# Patient Record
Sex: MALE | Race: WHITE
[De-identification: names, ages, dates, MRNs, and addresses within clinical notes are randomized per-mention and may not be internally consistent; named-entity substitution may affect disease eponyms.]

---
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
## 2019-08-01 ENCOUNTER — HOSPITAL ENCOUNTER (INPATIENT)
Dept: HOSPITAL 25 - AA | Age: 69
LOS: 2 days | Discharge: HOME HEALTH | DRG: 302 | End: 2019-08-03
Attending: ORTHOPAEDIC SURGERY | Admitting: ORTHOPAEDIC SURGERY
Payer: COMMERCIAL

## 2019-08-01 DIAGNOSIS — Z72.89: ICD-10-CM

## 2019-08-01 DIAGNOSIS — Z87.891: ICD-10-CM

## 2019-08-01 DIAGNOSIS — D51.9: ICD-10-CM

## 2019-08-01 DIAGNOSIS — Z83.49: ICD-10-CM

## 2019-08-01 DIAGNOSIS — E11.40: ICD-10-CM

## 2019-08-01 DIAGNOSIS — R33.9: ICD-10-CM

## 2019-08-01 DIAGNOSIS — Z82.3: ICD-10-CM

## 2019-08-01 DIAGNOSIS — Z98.52: ICD-10-CM

## 2019-08-01 DIAGNOSIS — Z87.442: ICD-10-CM

## 2019-08-01 DIAGNOSIS — E78.2: ICD-10-CM

## 2019-08-01 DIAGNOSIS — E78.00: ICD-10-CM

## 2019-08-01 DIAGNOSIS — M25.762: ICD-10-CM

## 2019-08-01 DIAGNOSIS — G47.33: ICD-10-CM

## 2019-08-01 DIAGNOSIS — F33.40: ICD-10-CM

## 2019-08-01 DIAGNOSIS — M17.12: Primary | ICD-10-CM

## 2019-08-01 DIAGNOSIS — Z79.84: ICD-10-CM

## 2019-08-01 DIAGNOSIS — Z83.3: ICD-10-CM

## 2019-08-01 DIAGNOSIS — M21.162: ICD-10-CM

## 2019-08-01 DIAGNOSIS — Z80.0: ICD-10-CM

## 2019-08-01 DIAGNOSIS — I10: ICD-10-CM

## 2019-08-01 DIAGNOSIS — M25.462: ICD-10-CM

## 2019-08-01 DIAGNOSIS — R00.1: ICD-10-CM

## 2019-08-01 PROCEDURE — C1776 JOINT DEVICE (IMPLANTABLE): HCPCS

## 2019-08-01 PROCEDURE — 85018 HEMOGLOBIN: CPT

## 2019-08-01 PROCEDURE — 85014 HEMATOCRIT: CPT

## 2019-08-01 PROCEDURE — 0SRD0J9 REPLACEMENT OF LEFT KNEE JOINT WITH SYNTHETIC SUBSTITUTE, CEMENTED, OPEN APPROACH: ICD-10-PCS | Performed by: ORTHOPAEDIC SURGERY

## 2019-08-01 PROCEDURE — 85049 AUTOMATED PLATELET COUNT: CPT

## 2019-08-01 PROCEDURE — 80048 BASIC METABOLIC PNL TOTAL CA: CPT

## 2019-08-01 PROCEDURE — 36415 COLL VENOUS BLD VENIPUNCTURE: CPT

## 2019-08-01 RX ADMIN — DOCUSATE SODIUM SCH MG: 100 CAPSULE, LIQUID FILLED ORAL at 22:03

## 2019-08-01 RX ADMIN — INSULIN LISPRO SCH: 100 INJECTION, SOLUTION INTRAVENOUS; SUBCUTANEOUS at 21:52

## 2019-08-01 RX ADMIN — SODIUM CHLORIDE, SODIUM LACTATE, POTASSIUM CHLORIDE, AND CALCIUM CHLORIDE SCH MLS/HR: 600; 310; 30; 20 INJECTION, SOLUTION INTRAVENOUS at 18:19

## 2019-08-01 RX ADMIN — APIXABAN SCH MG: 2.5 TABLET, FILM COATED ORAL at 22:04

## 2019-08-01 RX ADMIN — MAGNESIUM HYDROXIDE SCH ML: 400 SUSPENSION ORAL at 22:03

## 2019-08-01 RX ADMIN — CEFAZOLIN SCH MLS/HR: 1 INJECTION, POWDER, FOR SOLUTION INTRAVENOUS at 22:03

## 2019-08-01 RX ADMIN — ATORVASTATIN CALCIUM SCH MG: 40 TABLET, FILM COATED ORAL at 22:04

## 2019-08-01 RX ADMIN — ACETAMINOPHEN SCH MG: 325 TABLET ORAL at 18:21
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
## 2019-08-01 NOTE — PN
Progress Note





- Progress Note


Date of Service: 08/01/19


Note: 


Patient seen at bedside in PACU, s/p LTK.  He is alert and oriented.  He denies 

left knee pain.  He is has sensation and is able to dorsiflex the left foot. 

Dressing dry.

## 2019-08-01 NOTE — OP
Operative Report - Blank





- Operative Report


Date of Operation: 08/01/19


Note: 





SARA APODACA


1950





Date of Surgery: 8/1/19





Carolin Dave MD





Assistant: FRANCISCO JEAN did help throughout the procedure with preparation of 

the knee, wound retraction, manipulation of the knee, and wound closure.  





Anesthesiologist: ELIZABETH Hillman MD





Anesthesia Type: Spinal





Preoperative Diagnosis: Left severe degenerative osteoarthritis of the knee





Postoperative Diagnosis: As above





Procedure Performed: Left Total Knee Arthroplasty





Tourniquet time: 60 minutes





Complications: None





Specimen: Bone and cartilage from the left knee joint sent to pathology.  





Hardware Used: Cemented Smith [employer]hew total knee hardware was used - For the 

femur a size 7 left  legion posterior stabilized femoral component, for the 

tibia a size 6 left corey II tibial baseplate, for the insert a size 9mm 5-6 

posterior stabilized articular polyethylene insert, and for the patella a size 

38 3-peg all poly patella.  





Brief History/Indication: SARA APODACA was known in clinic and had a 

history of severe left knee pain and swelling. He failed conservative treatment 

with anti-inflammatories, pain pills, intra-articular injections and physical 

therapy.  He elected to undergo left total knee arthroplasty due to continued 

pain and decreased quality of life.  Radiographs showed severe end stage 

osteoarthritis of the knee with bone on bone contact.  Informed consent was 

obtained from the patient.  He understood the risks of surgery included but 

were not limited to: bleeding, infection, damage to nearby structures, 

intraoperative fracture, nerve palsy, failure of the hardware, early loosening, 

knee stiffness or loss of motion, anesthesia complications, stroke, heart attack

, blood clot and death.  He wished to proceed.





Intra-Operative Findings: Intraoperatively the patient was noted to have severe 

loss of cartilage in all 3 compartments of the knee.  





Description of the Procedure: 





SARA APODACA was identified in the preanesthesia unit. His left knee was 

marked as the correct operative side.  Informed consent was signed and placed 

in the chart. The patient was taken to the operating room and placed under 

anesthesia without complication. A kapoor catheter was placed. A tourniquet was 

placed on the left thigh. The left lower extremity was prepped and draped in 

the usual sterile fashion. Preoperative time-out was made to correctly identify 

the patient, side and site. Appropriate intraoperative antibiotics were given 

within one hour of incision.





Tourniquet was inflated. A midline incision was made and carried sharply down 

to the extensor mechanism. A new 10 blade was used to make a standard medial 

parapatellar arthrotomy. The patella was subluxed laterally. Electrocautery was 

used to dissect soft tissue off the superomedial tibia to the midsagittal 

plane.  The knee was flexed up. The anterior horn of the lateral meniscus and 

the ACL were sharply incised. A drill was used to enter the distal femur. The 

intramedullary distal femoral cutting guide was pinned on the distal femur. The 

oscillating saw was used to make the distal femoral cut.





The external rotation guide was pinned on the distal femur and the distal femur 

was sized to a size 7. The size 7 multi-cutting jig was pinned on the distal 

femur. The oscillating saw was used to make the appropriate 4 chamfer cuts.  

Next the PCL was completely released.  The extramedullary tibial cutting guide 

was pinned on the proximal tibia and the oscillating saw was used to make the 

proximal tibial cut perpendicular to the mechanical axis of the tibia. The bone 

was carefully removed.





The knee was brought out into full extension. The spacer block was placed and 

had excellent fit with the knee in full extension. The medial and lateral 

ligaments were well balanced. The flexion and extension gaps were well 

balanced. The knee was flexed up. Lamina  was placed both medially and 

laterally. Any remaining meniscus was removed with electrocautery.  Curved 

osteotome was used to remove any posterior osteophytes. The tibial tray and 

drop abdoul were placed and confirmed a satisfactory tibial cut.





The size 7 left femoral trial was impacted onto the distal femur. This trial 

had excellent fit and stability. The box for the posterior stabilized implant 

was prepared using a box cut osteotome and a reamer. Next a tibial tray trial 

and 9 mm insert trial was placed. The knee was taken through a range of motion 

and had full extension to 130 degrees of flexion. Patellofemoral tracking was 

satisfactory.





The patella was inverted and sized to a size 38. Three peg holes were drilled 

through the size 38 drill guide. The trial patella was placed and the knee was 

taken through a range of motion. There was satisfactory patellofemoral 

tracking. All trials were removed. The tibia was subluxed anteriorly and sized 

to a size 6. The proximal tibial was prepared with a size 6 keel punch.  All 

bony cut surfaces were irrigated with sterile saline and dried. Final implants 

were cemented into place starting with the tibia, followed by the femur, and 

last the patella. A 9 mm insert trial was placed and the knee was brought into 

full extension.





Tourniquet was turned down and the knee was copiously irrigated with sterile 

saline. Electrocautery was used to obtain meticulous hemostasis. Once the 

cement had fully cured, the insert trial was removed. Any excess cement was 

removed from around the hardware and capsule.  Final insert chosen was a 9 mm 

posterior stabilized Corey II articular insert size 5-6. Stability of the 

insert was checked and noted to be stable.





The extensor mechanism was closed using number 1 vicryls. The rest of the 

incision was closed in a layered fashion using 0 and 2-0 vicryls. The skin was 

closed using 3-0 nylon suture. Sterile xeroform, 4x4s and webril were used to 

cover the incision.  Ace wrap and cold pack were used to cover the dressings. 

The patients anesthesia was reversed without difficulty. He was taken to the 

PACU in stable condition.  Intended weight-bearing will be as tolerated.

## 2019-08-01 NOTE — XMS REPORT
Continuity of Care Document (CCD)

 Created on:2019



Patient:Edwardo Law

Sex:Male

:1950

External Reference #:[id number]





Demographics







 Address  [address]



   [place] [address]

 

 Mobile Phone  [phone]

 

 Email Address  [email or web address]

 

 Preferred Language  en

 

 Marital Status  Not  or 

 

 Rastafarian Affiliation  Unknown

 

 Race  White

 

 Ethnic Group  Not  or 









Author







 Name  Leni Yo









Support







 Name  Relationship  Address  Phone

 

 Chelsy Law  Unavailable  Unavailable  [phone]









Care Team Providers







 Name  Role  Phone

 

 Georgia Baker MD  Primary Care Physician  Unavailable









Payers







 Date  Identification Numbers  Payment Provider  Subscriber

 

   Policy Number: [id number]  [employer]  Chelsy Law









 PayID: [id number]  [address]









 [place] [address]







Problems







 Active Problems  Provider  Date

 

 Localized, primary osteoarthritis  Carolin Dave M.D.  Onset: 2019







Family History







 Date  Family Member(s)  Observation  Comments

 

   General  Diabetes  

 

   General  Cancer  







Social History







 Type  Date  Description  Comments

 

 Birth Sex    Unknown  

 

 Lives With    Spouse  

 

 Occupation      

 

 ETOH Use    Occasionally consumes  



     alcohol  

 

 Tobacco Use  Start: Unknown End:  Patient is a former smoker  



   Unknown    

 

 Smoking Status  Reviewed: 19  Patient is a former smoker  

 

 Exercise Type/Frequency    Exercises sporadically  







Allergies, Adverse Reactions, Alerts







 Description

 

 No Known Drug Allergies







Medications







 Active Medications  SIG  Qnty  Indications  Ordering Provider  Date

 

 Georgia Jang MD  



 50-1000mg Tablets          



           

 

 Hilaria Chang NP  



 10mg Tablets          

 

 Amlodipine Besylate        Georgia Baker MD  



           5mg Tablets          



           

 

 Irbesartan        Georgia Baker MD  



  300mg Tablets          



           

 

 Atorvastatin Calcium        Georgia Baker MD  



            40mg Tablets          



           









 History Medications









 Meclizine HCL        Georgia Baker MD   - 2019



   12.5mg Tablets          



           







Vital Signs







 Date  Vital  Result  Comment

 

 2019 11:29am  Height  71.5 inches  5'11.50"









 Weight  199.00 lb  

 

 Heart Rate  59 /min  

 

 BP Systolic  138 mmHg  

 

 BP Diastolic  62 mmHg  

 

 Respiratory Rate  12 /min  

 

 Body Temperature  98.2 F  

 

 Pain Level  2  

 

 BMI (Body Mass Index)  27.4 kg/m2  









 2019 10:02am  Height  71.5 inches  5'11.50"









 Weight  199.00 lb  

 

 BP Systolic  146 mmHg  

 

 BP Diastolic  80 mmHg  

 

 Body Temperature  97.8 F  

 

 BMI (Body Mass Index)  27.4 kg/m2  







Results







 Test  Date  Facility  Test  Result  H/L  Range  Note

 

 Xray  2019  [facility]  Knee 3 Views LT  <pending>      



     [address]          



     [place] [address] [phone]          







Encounters







 Type  Date  Location  Provider  Dx  Diagnosis

 

 Office Visit  2019  Or[facility]  Carolin Dave  M25.562  Pain in left knee



   10:00a  [facility]    









 M25.462  Effusion, left knee

 

 M17.12  Unilateral primary osteoarthritis, left knee

 

 M21.162  Varus deformity, not elsewhere classified, left knee







Plan of Treatment

Future Appointment(s):2019  1:30 pm - Carolin Dave M.D. at [facility]  1:30 pm - Carolin Dave M.D. at [facility] - Carolin Dave M.D.M25.562 Pain in left 
kneeFollow up:Follow up:   10- 14 days post opM25.462 Effusion, left kneeM17.12 
Unilateral primary osteoarthritis, left kneeM21.162 Varus deformity, not 
elsewhere classified, left knee

## 2019-08-02 LAB
ANION GAP SERPL CALC-SCNC: 5 MMOL/L (ref 2–11)
BUN SERPL-MCNC: 17 MG/DL (ref 6–24)
BUN/CREAT SERPL: 18.7 (ref 8–20)
CALCIUM SERPL-MCNC: 8.3 MG/DL (ref 8.6–10.3)
CHLORIDE SERPL-SCNC: 99 MMOL/L (ref 101–111)
GLUCOSE SERPL-MCNC: 142 MG/DL (ref 70–100)
HCO3 SERPL-SCNC: 30 MMOL/L (ref 22–32)
HCT VFR BLD AUTO: 39 % (ref 42–52)
HGB BLD-MCNC: 13.5 G/DL (ref 14–18)
PLATELET # BLD AUTO: 189 10^3/UL (ref 150–450)
POTASSIUM SERPL-SCNC: 4 MMOL/L (ref 3.5–5)
SODIUM SERPL-SCNC: 134 MMOL/L (ref 135–145)

## 2019-08-02 RX ADMIN — OXYCODONE HYDROCHLORIDE AND ACETAMINOPHEN PRN TAB: 5; 325 TABLET ORAL at 07:29

## 2019-08-02 RX ADMIN — MORPHINE SULFATE PRN MG: 4 INJECTION INTRAVENOUS at 17:07

## 2019-08-02 RX ADMIN — DOCUSATE SODIUM SCH MG: 100 CAPSULE, LIQUID FILLED ORAL at 09:37

## 2019-08-02 RX ADMIN — ACETAMINOPHEN SCH: 325 TABLET ORAL at 11:34

## 2019-08-02 RX ADMIN — OXYCODONE HYDROCHLORIDE PRN MG: 5 CAPSULE ORAL at 09:43

## 2019-08-02 RX ADMIN — OXYCODONE HYDROCHLORIDE AND ACETAMINOPHEN PRN TAB: 5; 325 TABLET ORAL at 21:36

## 2019-08-02 RX ADMIN — ACETAMINOPHEN SCH: 325 TABLET ORAL at 02:22

## 2019-08-02 RX ADMIN — MAGNESIUM HYDROXIDE SCH ML: 400 SUSPENSION ORAL at 21:13

## 2019-08-02 RX ADMIN — INSULIN LISPRO SCH UNITS: 100 INJECTION, SOLUTION INTRAVENOUS; SUBCUTANEOUS at 13:06

## 2019-08-02 RX ADMIN — INSULIN LISPRO SCH: 100 INJECTION, SOLUTION INTRAVENOUS; SUBCUTANEOUS at 09:33

## 2019-08-02 RX ADMIN — CEFAZOLIN SCH MLS/HR: 1 INJECTION, POWDER, FOR SOLUTION INTRAVENOUS at 06:01

## 2019-08-02 RX ADMIN — OXYCODONE HYDROCHLORIDE AND ACETAMINOPHEN PRN TAB: 5; 325 TABLET ORAL at 17:08

## 2019-08-02 RX ADMIN — INSULIN LISPRO SCH UNITS: 100 INJECTION, SOLUTION INTRAVENOUS; SUBCUTANEOUS at 21:13

## 2019-08-02 RX ADMIN — INSULIN LISPRO SCH UNITS: 100 INJECTION, SOLUTION INTRAVENOUS; SUBCUTANEOUS at 18:23

## 2019-08-02 RX ADMIN — MORPHINE SULFATE PRN MG: 4 INJECTION INTRAVENOUS at 19:21

## 2019-08-02 RX ADMIN — AMLODIPINE BESYLATE SCH MG: 5 TABLET ORAL at 09:37

## 2019-08-02 RX ADMIN — MAGNESIUM HYDROXIDE SCH ML: 400 SUSPENSION ORAL at 09:38

## 2019-08-02 RX ADMIN — ACETAMINOPHEN SCH: 325 TABLET ORAL at 18:34

## 2019-08-02 RX ADMIN — APIXABAN SCH MG: 2.5 TABLET, FILM COATED ORAL at 09:37

## 2019-08-02 RX ADMIN — CEFAZOLIN SCH MLS/HR: 1 INJECTION, POWDER, FOR SOLUTION INTRAVENOUS at 14:14

## 2019-08-02 RX ADMIN — OXYCODONE HYDROCHLORIDE AND ACETAMINOPHEN PRN TAB: 5; 325 TABLET ORAL at 13:07

## 2019-08-02 RX ADMIN — OXYCODONE HYDROCHLORIDE PRN MG: 5 CAPSULE ORAL at 19:22

## 2019-08-02 RX ADMIN — DOCUSATE SODIUM SCH MG: 100 CAPSULE, LIQUID FILLED ORAL at 21:13

## 2019-08-02 RX ADMIN — OXYCODONE HYDROCHLORIDE AND ACETAMINOPHEN PRN TAB: 5; 325 TABLET ORAL at 02:18

## 2019-08-02 RX ADMIN — OXYCODONE HYDROCHLORIDE PRN MG: 5 CAPSULE ORAL at 04:45

## 2019-08-02 RX ADMIN — ATORVASTATIN CALCIUM SCH MG: 40 TABLET, FILM COATED ORAL at 21:13

## 2019-08-02 RX ADMIN — APIXABAN SCH MG: 2.5 TABLET, FILM COATED ORAL at 21:13

## 2019-08-02 RX ADMIN — SODIUM CHLORIDE, SODIUM LACTATE, POTASSIUM CHLORIDE, AND CALCIUM CHLORIDE SCH MLS/HR: 600; 310; 30; 20 INJECTION, SOLUTION INTRAVENOUS at 04:47

## 2019-08-02 NOTE — PN
Subjective


Date of Service: 08/02/19


Interval History: 





pt feels well, walked with PT, feels "almost ready" to go home





Objective


Active Medications: 








Acetaminophen (Tylenol Tab*)  975 mg PO Q8H [facility]


   Last Admin: 08/02/19 02:22 Dose:  Not Given


Amlodipine Besylate (Norvasc Tab*)  5 mg PO QAM [facility]


   Last Admin: 08/02/19 09:37 Dose:  5 mg


Apixaban (Eliquis*)  2.5 mg PO BID [facility]


   Last Admin: 08/02/19 09:37 Dose:  2.5 mg


Atorvastatin Calcium (Lipitor*)  40 mg PO BEDTIME [facility]


   Last Admin: 08/01/19 22:04 Dose:  40 mg


Bisacodyl (Dulcolax Supp*)  10 mg AL DAILY PRN


   PRN Reason: constipation


Cyclobenzaprine HCl (Flexeril Tab*)  10 mg PO TID PRN


   PRN Reason: SPASMS


Dextrose (D50w Syringe 50 Ml*)  12.5 gm IV PUSH .FOR FS < 60 - SS PRN


   PRN Reason: FS < 60


Diphenhydramine HCl (Benadryl Iv*)  25 mg IV Q6H PRN


   PRN Reason: itching


Diphenhydramine HCl (Benadryl Po*)  25 mg PO Q6H PRN


   PRN Reason: itching


Docusate Sodium (Colace Cap*)  100 mg PO BID [facility]


   Last Admin: 08/02/19 09:37 Dose:  100 mg


Cefazolin Sodium 1 gm/ Sodium (Chloride)  50 mls @ 200 mls/hr IVPB Q8H [facility]


   Stop: 08/02/19 14:14


   Last Admin: 08/02/19 06:01 Dose:  200 mls/hr


Lactated Ringer's (Lactated Ringers 1000 Ml Bag*)  1,000 mls @ 100 mls/hr IV 

PER RATE [facility]


   Last Admin: 08/02/19 04:47 Dose:  100 mls/hr


Insulin Human Lispro (Humalog*)  0 units SUBCUT ACHS [facility]; Protocol


   Last Admin: 08/02/19 09:33 Dose:  Not Given


Lactulose (Lactulose*)  30 ml PO Q6H PRN


   PRN Reason: constipation


Magnesium Hydroxide (Milk Of Magnesia Liq*)  30 ml PO BID [facility]


   Last Admin: 08/02/19 09:38 Dose:  30 ml


Magnesium Hydroxide (Milk Of Magnesia Liq*)  30 ml PO Q6H PRN


   PRN Reason: constipation


Meclizine HCl (Antivert Tab*)  12.5 mg PO TID PRN


   PRN Reason: VERTIGO


Morphine Sulfate (Morphine 4 Mg/Ml Vial (1 Ml))  2 mg IV Q2H PRN


   PRN Reason: SEVERE PAIN


Ondansetron HCl (Zofran Inj*)  4 mg IV Q6H PRN


   PRN Reason: nausea


Ondansetron HCl (Zofran Odt Tab*)  4 mg PO Q6H PRN


   PRN Reason: NAUSEA


Oxycodone HCl (Roxycodone Tab*)  10 mg PO Q4H PRN


   PRN Reason: PAIN - SEVERE


   Last Admin: 08/02/19 09:43 Dose:  10 mg


Oxycodone/Acetaminophen (Percocet 5/325 Tab*)  1 tab PO Q4H PRN


   PRN Reason: PAIN - MILD


Oxycodone/Acetaminophen (Percocet 5/325 Tab*)  2 tab PO Q4H PRN


   PRN Reason: PAIN - MODERATE


   Last Admin: 08/02/19 07:29 Dose:  2 tab


Polyethylene Glycol/Electrolytes (Miralax*)  17 gm PO DAILY PRN


   PRN Reason: Constipation


Temazepam (Restoril Cap*)  15 mg PO BEDTIME PRN


   PRN Reason: INSOMNIA


   Last Admin: 08/01/19 22:04 Dose:  15 mg


Tramadol HCl (Ultram*)  50 mg PO Q6H PRN


   PRN Reason: PAIN


   Last Admin: 08/01/19 22:04 Dose:  50 mg








 Vital Signs - 8 hr











  08/02/19 08/02/19 08/02/19





  02:18 02:20 03:29


 


Temperature   99.5 F


 


Pulse Rate   82


 


Respiratory 17 17 17





Rate   


 


Blood Pressure   108/59





(mmHg)   


 


O2 Sat by Pulse   96





Oximetry   














  08/02/19 08/02/19 08/02/19





  03:36 04:45 04:50


 


Temperature   


 


Pulse Rate   


 


Respiratory  17 17





Rate   


 


Blood Pressure   





(mmHg)   


 


O2 Sat by Pulse 96  





Oximetry   














  08/02/19 08/02/19 08/02/19





  07:29 07:46 09:43


 


Temperature  98.3 F 


 


Pulse Rate  76 


 


Respiratory 16 16 16





Rate   


 


Blood Pressure  130/63 





(mmHg)   


 


O2 Sat by Pulse  94 





Oximetry   











Oxygen Devices in Use Now: None


Appearance: 70 yo M in nAD, aAOx3


Eyes: No Scleral Icterus, PERRLA


Ears/Nose/Mouth/Throat: NL Teeth, Lips, Gums, Mucous Membranes Moist


Neck: NL Appearance and Movements; NL JVP, Trachea Midline


Respiratory: Symmetrical Chest Expansion and Respiratory Effort, Clear to 

Auscultation


Cardiovascular: NL Sounds; No Murmurs; No JVD


Abdominal: NL Sounds; No Tenderness; No Distention, No Hepatosplenomegaly


Lymphatic: No Cervical Adenopathy


Extremities: No Edema, No Clubbing, Cyanosis, - - left knee in cryo unit, 

dressings not removed


Skin: No Rash or Ulcers, No Nodules or Sclerosis


Neurological: Alert and Oriented x 3, NL Muscle Strength and Tone


Result Diagrams: 


 08/02/19 07:26





 08/02/19 07:26





Assess/Plan/Problems-Billing


Assessment: 70 yo M with h/o HTN, PEPE, DM2 s/p elective left nee replacement











- Patient Problems


(1) Status post left knee replacement


Comment: as per orthopedic team   





(2) DM2 (diabetes mellitus, type 2)


Comment: cont iSS, oral meds on hold   





(3) HTN (hypertension)


Comment: cont Norvasc, holding Irbesartan, can be  resarted at discharge   





(4) Bradycardia


Comment: post op, resolved, asymptomatic   





(5) PEPE (obstructive sleep apnea)


Comment: cont home CPAP   





(6) DVT prophylaxis


Comment: eliquis   


Status and Disposition: 


Medicine consult, will follow

## 2019-08-02 NOTE — DS
Orthopedic Discharge Summary





- Discharge Summary





Date of Admission:19


Date of Discharge: 19


Date of Surgery: 19


Attending Orthopedic Provider: Dr. Dave


Pre-operative Diagnosis: Degenerative arthritis left knee


Operative Procedure: Left total knee arthroplasty


Disposition of Patient: home


Condition of Patient: stable


History: SARA APODACA is a 69 year old M with years of increasingly severe 

left knee pain. Patient has failed conservative management and has elected to 

undergo a left total knee replacement


Hospital Course: SARA was admitted to [facility] on 19. 

Patient underwent a left total knee arthroplasty without complication followed 

by a brief recovery in PACU and transfer to the Short Stay Surgical Unit in 

stable condition. Our hospitalist service, physical therapy and occupational 

therapy also participated in this patients care. Post-op day 1: patient was 

alert and in no acute distress. Dressing was clean, dry and intact. Operative 

extremity dorsiflexion and plantarflexion intact, sensation intact to light 

touch distally, DP2+,  dressing was changed, incision was clean, dry and 

intact. Patient was deemed to be medically and orthopedically stable for 

discharge. Physical therapy goals were met.





 Home Medications











 Medication  Instructions  Recorded  Confirmed  Type


 


Atorvastatin* [Lipitor 40 MG*] 40 mg PO BEDTIME 19 History


 


Cyclobenzaprine TAB* [Flexeril 10 10 mg PO ONCE PRN 19 History





MG TAB*]    


 


Empagliflozin [Jardiance] 10 mg PO QAM 19 History


 


Irbesartan 300 mg PO QAM 19 History


 


Meclizine TAB* [Antivert 12.5 TAB*] 12.5 mg PO TID PRN 19 History


 


Sildenafil Citrate [Viagra] 100 mg PO ONCE PRN 19 History


 


Sitagliptin Phos/Metformin HCl 1 tab PO BID 19 History





[Janumet 50-1,000 mg Tablet]    


 


amLODIPine TAB* [Norvasc 5 mg TAB*] 5 mg PO QAM 19 History


 


Apixaban* [Eliquis*] 2.5 mg PO BID #60 tab 19  Rx


 


Docusate CAP* [Colace Cap*] 100 mg PO BID  cap 19  Rx


 


oxyCODONE/Acetamin 5/325 MG* 1 - 2 tab PO Q4H PRN #70 tab MDD 10 19  Rx





[Percocet 5/325 TAB*]    











Discharge Instructions following Orthopedic Surgery:





Activity:  


* Weight Bearing as tolerated


* Continue physical therapy and occupational therapy exercises as shown








Wound care: 


* OK to shower on post-op day 3, no bathing, swimming, or submerging wound. 


* Use gentle soap, pat dry. Cover with gauze, ACE wrap or tape.


* Visiting home nurse to do wound checks.





Call Orthopedic office for: 


* Increased drainage


* Redness


* Increased pain


* Fever 





***Go to ER with shortness of breath or chest pain.***





Diet: 


* Regular diet


* Increase fluids and fiber to prevent constipation. 


* Continue to use stool softeners, call office if no bowel motion within 48 

hours.








Medications


See Home Medication List in your packet for medications that you should take 

after discharge.





DVT Prophylaxis:








   Eliquis Dosin.5 mg, 1 tab every 12 hours x 30 days





Pain Control:





   Percocet Dosin/325 mg 1-2 tabs by mouth every 4-6 hours as needed for 

pain. Maximum of 10 tabs per day.








**Please note that Percocet contains Tylenol (acetaminophen). Maximum daily 

dose of Tylenol is 4000 mg from all sources.**





Antibiotics are required prior to any dental work.








FOLLOW UP: Follow up with Dr. Dave Within 10-14 days, call for appointment





Please call our office with any questions or concerns ([phone])

## 2019-08-03 VITALS — SYSTOLIC BLOOD PRESSURE: 132 MMHG | DIASTOLIC BLOOD PRESSURE: 76 MMHG

## 2019-08-03 LAB
HCT VFR BLD AUTO: 36 % (ref 42–52)
HGB BLD-MCNC: 12.4 G/DL (ref 14–18)
PLATELET # BLD AUTO: 176 10^3/UL (ref 150–450)

## 2019-08-03 RX ADMIN — OXYCODONE HYDROCHLORIDE AND ACETAMINOPHEN PRN TAB: 5; 325 TABLET ORAL at 07:29

## 2019-08-03 RX ADMIN — ACETAMINOPHEN SCH: 325 TABLET ORAL at 09:51

## 2019-08-03 RX ADMIN — INSULIN LISPRO SCH UNITS: 100 INJECTION, SOLUTION INTRAVENOUS; SUBCUTANEOUS at 11:51

## 2019-08-03 RX ADMIN — MAGNESIUM HYDROXIDE SCH: 400 SUSPENSION ORAL at 09:05

## 2019-08-03 RX ADMIN — INSULIN LISPRO SCH UNITS: 100 INJECTION, SOLUTION INTRAVENOUS; SUBCUTANEOUS at 09:02

## 2019-08-03 RX ADMIN — ACETAMINOPHEN SCH MG: 325 TABLET ORAL at 02:15

## 2019-08-03 RX ADMIN — OXYCODONE HYDROCHLORIDE PRN MG: 5 CAPSULE ORAL at 11:51

## 2019-08-03 RX ADMIN — AMLODIPINE BESYLATE SCH MG: 5 TABLET ORAL at 09:02

## 2019-08-03 RX ADMIN — APIXABAN SCH MG: 2.5 TABLET, FILM COATED ORAL at 09:02

## 2019-08-03 RX ADMIN — OXYCODONE HYDROCHLORIDE PRN MG: 5 CAPSULE ORAL at 02:14

## 2019-08-03 RX ADMIN — DOCUSATE SODIUM SCH MG: 100 CAPSULE, LIQUID FILLED ORAL at 09:02

## 2019-08-03 NOTE — DS
*** AMENDED REPORT NOW INCLUDES DESIGNATED COSIGNER ***



DISCHARGE SUMMARY:

 

DATE OF ADMISSION:  08/01/19

 

DATE OF DISCHARGE:  08/03/19

 

ATTENDING PROVIDER:  Dr. Dave * (DICTATED BY TED SHERWOOD)



ADDENDUM:  The patient's discharge on 08/02/19 was held because of urinary 
retention.  On 08/03/19, the urinary retention is resolved.  The patient was 
able to void and he was discharged to home in a stable condition on 08/03/19.

 

____________________________________ TED SHERWOOD

 

[id number]/[facility] #: [id number]

MTDD

## 2019-08-03 NOTE — PN
Progress Note





- Progress Note


Date of Service: 08/03/19


SOAP: 


Subjective:


Pt. is alert, able to urinate this AM.  L knee pain 6/10.  





Objective:


Vital Signs:











Temp Pulse Resp BP Pulse Ox


 


 99.6 F   91   18   120/61   91 


 


 08/03/19 03:15  08/03/19 03:15  08/03/19 07:29  08/03/19 03:15  08/03/19 03:15








 Laboratory Results - last 24 hr











  08/02/19 08/02/19 08/02/19





  07:26 07:26 07:32


 


Hgb  13.5 L  


 


Hct  39 L  


 


Plt Count  189  


 


MPV  7.5  


 


Sodium   134 L 


 


Potassium   4.0 


 


Chloride   99 L 


 


Carbon Dioxide   30 


 


Anion Gap   5 


 


BUN   17 


 


Creatinine   0.91 


 


Est GFR ( Amer)   100.3 


 


Est GFR (Non-Af Amer)   82.9 


 


BUN/Creatinine Ratio   18.7 


 


Glucose   142 H 


 


POC Glucose (mg/dL)    128 H


 


Calcium   8.3 L 














  08/02/19 08/02/19 08/02/19





  11:48 17:18 21:06


 


Hgb   


 


Hct   


 


Plt Count   


 


MPV   


 


Sodium   


 


Potassium   


 


Chloride   


 


Carbon Dioxide   


 


Anion Gap   


 


BUN   


 


Creatinine   


 


Est GFR ( Amer)   


 


Est GFR (Non-Af Amer)   


 


BUN/Creatinine Ratio   


 


Glucose   


 


POC Glucose (mg/dL)  158 H  169 H  264 H


 


Calcium   














  08/03/19





  05:17


 


Hgb  12.4 L


 


Hct  36 L


 


Plt Count  176


 


MPV  7.6


 


Sodium 


 


Potassium 


 


Chloride 


 


Carbon Dioxide 


 


Anion Gap 


 


BUN 


 


Creatinine 


 


Est GFR ( Amer) 


 


Est GFR (Non-Af Amer) 


 


BUN/Creatinine Ratio 


 


Glucose 


 


POC Glucose (mg/dL) 


 


Calcium 








LLE - inc c/d/i.  distally nvi.  








Assessment:


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








Plan:


urinary retention resolved


PT this am then home with vns


wbat lle


eliquis bid